# Patient Record
(demographics unavailable — no encounter records)

---

## 2025-07-10 NOTE — PROCEDURE
[Flexible Scope  (R)] : Flexible Scope (R) [Flexible Scope  (L)] : Flexible Scope (L) [None] : None [FreeTextEntry1] : EPISTAXIS [FreeTextEntry2] : EPISATXIS [FreeTextEntry3] : PROCEDURE: NASAL ENDOSCOPY  After informed verbal consent is obtained, the fiberoptic nasal endoscope is passed via the right nasal cavity. The osteomeatal complex is clear with no polyposis or purulence. The sphenoethmoidal recess is clear with no polyposis or purulence. There is no evidence of tumor or masses. The choana is patent.  The fiberoptic nasal endoscope is passed via the left nasal cavity. The osteomeatal complex is clear with no polyposis or purulence. The sphenoethmoidal recess is clear with no polyposis or purulence.  There is no evidence of tumor or masses. The choana is patent.  There is 40% obstruction of the nasopharynx with adenoid tissue.

## 2025-07-10 NOTE — HISTORY OF PRESENT ILLNESS
[No change in the review of systems as noted in prior visit date ___] : No change in the review of systems as noted in prior visit date of [unfilled] [de-identified] : History of vocal nodules and epistaxis Hoarseness improved History of frequent nose bleeds (1-3 times per month) Recently improved after using nasal steroid spray No easy bruisability No weight loss No night sweats No ER visits for epistaxis No nasal packing  History of vocal nodules Hoarseness has been improving

## 2025-07-10 NOTE — CONSULT LETTER
[Courtesy Letter:] : I had the pleasure of seeing your patient, [unfilled], in my office today. [Sincerely,] : Sincerely, [FreeTextEntry2] : Dr. Mercedez Stover MD [address] [place] [address] [FreeTextEntry3] : Jakob Julian MD Chief, Pediatric Otolaryngology [facility] and Lakisha Jacob [facility] Professor of Otolaryngology [facility] [employer] [facility]

## 2025-07-10 NOTE — REASON FOR VISIT
[Initial Evaluation] : an initial evaluation for [Nasal Obstruction] : nasal obstruction [Nose Bleeds] : nose bleeds [Hoarseness/Dysphonia] : hoarseness/dysphonia [Mother] : mother